# Patient Record
Sex: MALE | Race: WHITE
[De-identification: names, ages, dates, MRNs, and addresses within clinical notes are randomized per-mention and may not be internally consistent; named-entity substitution may affect disease eponyms.]

---

## 2020-08-25 ENCOUNTER — HOSPITAL ENCOUNTER (OUTPATIENT)
Dept: HOSPITAL 19 - SDCO | Age: 20
LOS: 1 days | Discharge: HOME | End: 2020-08-26
Attending: UROLOGY
Payer: COMMERCIAL

## 2020-08-25 VITALS — SYSTOLIC BLOOD PRESSURE: 102 MMHG | HEART RATE: 83 BPM | DIASTOLIC BLOOD PRESSURE: 46 MMHG

## 2020-08-25 VITALS — SYSTOLIC BLOOD PRESSURE: 115 MMHG | DIASTOLIC BLOOD PRESSURE: 59 MMHG | HEART RATE: 86 BPM

## 2020-08-25 VITALS — HEART RATE: 84 BPM | DIASTOLIC BLOOD PRESSURE: 50 MMHG | SYSTOLIC BLOOD PRESSURE: 106 MMHG | TEMPERATURE: 98.6 F

## 2020-08-25 VITALS — DIASTOLIC BLOOD PRESSURE: 68 MMHG | HEART RATE: 86 BPM | SYSTOLIC BLOOD PRESSURE: 145 MMHG

## 2020-08-25 VITALS — SYSTOLIC BLOOD PRESSURE: 118 MMHG | TEMPERATURE: 97.7 F | HEART RATE: 63 BPM | DIASTOLIC BLOOD PRESSURE: 64 MMHG

## 2020-08-25 VITALS — BODY MASS INDEX: 20.09 KG/M2 | WEIGHT: 156.53 LBS | HEIGHT: 74.02 IN

## 2020-08-25 VITALS — HEART RATE: 88 BPM | SYSTOLIC BLOOD PRESSURE: 119 MMHG | DIASTOLIC BLOOD PRESSURE: 68 MMHG

## 2020-08-25 VITALS — TEMPERATURE: 97.6 F | DIASTOLIC BLOOD PRESSURE: 82 MMHG | SYSTOLIC BLOOD PRESSURE: 142 MMHG | HEART RATE: 82 BPM

## 2020-08-25 VITALS — HEART RATE: 95 BPM | DIASTOLIC BLOOD PRESSURE: 99 MMHG | SYSTOLIC BLOOD PRESSURE: 125 MMHG

## 2020-08-25 VITALS — HEART RATE: 72 BPM | SYSTOLIC BLOOD PRESSURE: 130 MMHG | DIASTOLIC BLOOD PRESSURE: 79 MMHG

## 2020-08-25 VITALS — SYSTOLIC BLOOD PRESSURE: 135 MMHG | DIASTOLIC BLOOD PRESSURE: 76 MMHG | HEART RATE: 79 BPM

## 2020-08-25 DIAGNOSIS — Z82.49: ICD-10-CM

## 2020-08-25 DIAGNOSIS — Z20.828: ICD-10-CM

## 2020-08-25 DIAGNOSIS — I86.1: ICD-10-CM

## 2020-08-25 DIAGNOSIS — N13.5: Primary | ICD-10-CM

## 2020-08-25 PROCEDURE — A9284 NON-ELECTRONIC SPIROMETER: HCPCS

## 2020-08-25 PROCEDURE — A4314 CATH W/DRAINAGE 2-WAY LATEX: HCPCS

## 2020-08-25 PROCEDURE — C1769 GUIDE WIRE: HCPCS

## 2020-08-25 PROCEDURE — C1729 CATH, DRAINAGE: HCPCS

## 2020-08-25 PROCEDURE — C2617 STENT, NON-COR, TEM W/O DEL: HCPCS

## 2020-08-25 NOTE — NUR
Patient to room via bed from PACU. Patient is alert and oriented but drowsy.
Rates pain in abd 8/10 and requests pain medication. Lap sites x5 with edges
all well approximated, no redness/discharge/swelling. The top two lap sites
have band aids that are CDI, the remaining three are open to air. Horner to
dependent drainage with clear yellow urine noted in tubing. Patient says that
he is hungry. Jello, water, and apple juice provided. Explain that we want to
make sure he will not get nauseated and will start with this and can advance
his diet if he is able to tolerate. Mother at bedside with the patient.
Oriented to room.

## 2020-08-25 NOTE — NUR
Initial visit; Patient and his mom thanked  for offering encouragement
and prayer for Grey prior to his surgical procedure.

## 2020-08-26 VITALS — SYSTOLIC BLOOD PRESSURE: 111 MMHG | DIASTOLIC BLOOD PRESSURE: 44 MMHG | HEART RATE: 58 BPM | TEMPERATURE: 98.5 F

## 2020-08-26 VITALS — HEART RATE: 58 BPM | TEMPERATURE: 97.9 F | DIASTOLIC BLOOD PRESSURE: 89 MMHG | SYSTOLIC BLOOD PRESSURE: 102 MMHG

## 2020-08-26 VITALS — TEMPERATURE: 98 F | DIASTOLIC BLOOD PRESSURE: 49 MMHG | HEART RATE: 51 BPM | SYSTOLIC BLOOD PRESSURE: 113 MMHG

## 2020-08-26 NOTE — NUR
Patient has rested well throughout the night. Up to chair for an hour and a
half this shift. Ambulated x2. Patient complains of abdominal pain, but states
the pain in his shoulders is worse. Patient educated on gas pains
post-surgery. Verbalized understanding. Patient noted to have 5 lap sites with
the top 2 covered with bandaids and the bottom 3 are open to air. NAV drain
noted to right lower abdomen. Tegaderm covering NAV drain is clean, dry, and
intact. 15ml of bloody output from drain this shift. Horner continues to drain
clear, serafin urine. Patient continues on IVF d/t darker urine. Tolerating PO
with no difficulties. Will continue to monitor patient.

## 2020-08-26 NOTE — NUR
Patient uses call light and is ready for discharge. Voided dark orange, with a
little red urine. Encouraged patient to continue to monitor and also increase
fluid intake, same as he had been voiding.
Patient assisted out to POV by JUANITA Soliz, and his mother
with all personal belongings.

## 2020-08-26 NOTE — NUR
Lying in bed with eyes closed. Opens eyes when name called out. Says that the
medication given earlier helped a lot with his pain. Administered scheduled
Tylenol at this time. Discussed NAV drain removal and drain removed at this
time. 4x4 applied to site and covered with tegaderm. Patient tolerates without
difficulty. Mom in room with the patient. Encouraged patient to increase fluid
intake and activity. Patient verbalizes understanding and denies further needs
at this time.

## 2020-08-26 NOTE — NUR
Reviewed all discharge instructions with the patient and his mother. Questions
answered. Patient verbalizes understanding and signs discharge paperwork.
Discharge packet provided to the patient. Patient will use call light when
ready to be taken out.

## 2020-08-26 NOTE — NUR
Sitting up in chair watching TV. Rates pain 5/10 in abd, would like pain
medication when able.  Lap sites with all edges well approximated, no
redness/swelling/discharge noted. NAV drain to right lower abd compressed with
minimal drainage in bulb. Denies any further needs or concerns at this time.
Explain that we will dc the IV fluids and also his catheter this a.m.

## 2021-06-08 ENCOUNTER — HOSPITAL ENCOUNTER (EMERGENCY)
Dept: HOSPITAL 19 - COL.ER | Age: 21
Discharge: HOME | End: 2021-06-08
Attending: EMERGENCY MEDICINE
Payer: COMMERCIAL

## 2021-06-08 VITALS — BODY MASS INDEX: 21.22 KG/M2 | HEIGHT: 74.02 IN | WEIGHT: 165.35 LBS

## 2021-06-08 VITALS — TEMPERATURE: 98.7 F | SYSTOLIC BLOOD PRESSURE: 127 MMHG | HEART RATE: 69 BPM | DIASTOLIC BLOOD PRESSURE: 71 MMHG

## 2021-06-08 DIAGNOSIS — G43.909: Primary | ICD-10-CM
